# Patient Record
Sex: MALE | Race: WHITE | NOT HISPANIC OR LATINO | URBAN - METROPOLITAN AREA
[De-identification: names, ages, dates, MRNs, and addresses within clinical notes are randomized per-mention and may not be internally consistent; named-entity substitution may affect disease eponyms.]

---

## 2021-03-30 ENCOUNTER — OFFICE VISIT (OUTPATIENT)
Dept: URBAN - METROPOLITAN AREA CLINIC 74 | Facility: CLINIC | Age: 76
End: 2021-03-30
Payer: COMMERCIAL

## 2021-03-30 ENCOUNTER — DASHBOARD ENCOUNTERS (OUTPATIENT)
Age: 76
End: 2021-03-30

## 2021-03-30 VITALS
SYSTOLIC BLOOD PRESSURE: 158 MMHG | WEIGHT: 293.4 LBS | HEART RATE: 97 BPM | TEMPERATURE: 97.3 F | HEIGHT: 72 IN | DIASTOLIC BLOOD PRESSURE: 82 MMHG | BODY MASS INDEX: 39.74 KG/M2

## 2021-03-30 DIAGNOSIS — R19.5 POSITIVE FIT (FECAL IMMUNOCHEMICAL TEST): ICD-10-CM

## 2021-03-30 DIAGNOSIS — K21.9 GASTROESOPHAGEAL REFLUX DISEASE, UNSPECIFIED WHETHER ESOPHAGITIS PRESENT: ICD-10-CM

## 2021-03-30 DIAGNOSIS — K64.9 HEMORRHOIDS, UNSPECIFIED HEMORRHOID TYPE: ICD-10-CM

## 2021-03-30 PROBLEM — 235595009: Status: ACTIVE | Noted: 2021-03-30

## 2021-03-30 PROCEDURE — 99204 OFFICE O/P NEW MOD 45 MIN: CPT | Performed by: STUDENT IN AN ORGANIZED HEALTH CARE EDUCATION/TRAINING PROGRAM

## 2021-03-30 RX ORDER — IBUPROFEN SODIUM 256 MG/1
1 TABLET WITH FOOD OR MILK AS NEEDED TABLET, COATED ORAL THREE TIMES A DAY
Status: ACTIVE | COMMUNITY

## 2021-03-30 RX ORDER — OMEPRAZOLE MAGNESIUM 10 MG/1
AS DIRECTED GRANULE, DELAYED RELEASE ORAL
Status: ACTIVE | COMMUNITY

## 2021-03-30 RX ORDER — LEVOTHYROXINE SODIUM 200 UG/1
1 TABLET IN THE MORNING ON AN EMPTY STOMACH TABLET ORAL ONCE A DAY
Status: ACTIVE | COMMUNITY

## 2021-03-30 RX ORDER — HYDROCHLOROTHIAZIDE 12.5 MG/1
1 CAPSULE IN THE MORNING CAPSULE ORAL ONCE A DAY
Status: ACTIVE | COMMUNITY

## 2021-03-30 RX ORDER — SODIUM, POTASSIUM,MAG SULFATES 17.5-3.13G
354ML SOLUTION, RECONSTITUTED, ORAL ORAL
Qty: 354 MILLILITER | Refills: 0 | OUTPATIENT
Start: 2021-03-30 | End: 2021-03-31

## 2021-03-30 RX ORDER — FOLIC ACID 0.8 MG
1 TABLET TABLET ORAL ONCE A DAY
Status: ACTIVE | COMMUNITY

## 2021-03-30 RX ORDER — ATORVASTATIN CALCIUM 10 MG/1
1 TABLET TABLET, FILM COATED ORAL ONCE A DAY
Status: ACTIVE | COMMUNITY

## 2021-03-30 NOTE — HPI-TODAY'S VISIT:
75-year-old male Comes in after his stool test became positive for blood in stool. Patient last colonoscopy was about 10 years back.  At this colonoscopy there were no polyps found.  Patient was given a 10-year interval for repeat colonoscopy.  Patient has been getting yearly stool based fecal immunochemical testing which turned out to be positive this year.  Patient denies seeing any overt blood in his stools.  No constipation or diarrhea.  No abdomen pain.  No nausea or vomiting.  No reflux or dysphagia.  Patient does takes PPI at baseline for control of his reflux and he does seems to keep it under control. Good appetite and denies any weight loss. No family history of colon cancer. No anticoagulation.  Patient takes aspirin 81 mg on every day basis. Patient reports remote history of blood on toilet paper which he attributes to his hemorrhoids which bother him very intermittently and patient does not want them to be addressed at all.

## 2021-04-30 PROBLEM — 70153002: Status: ACTIVE | Noted: 2021-03-30

## 2021-04-30 PROBLEM — 59614000: Status: ACTIVE | Noted: 2021-03-30

## 2021-05-06 ENCOUNTER — OFFICE VISIT (OUTPATIENT)
Dept: URBAN - METROPOLITAN AREA SURGERY CENTER 30 | Facility: SURGERY CENTER | Age: 76
End: 2021-05-06
Payer: COMMERCIAL

## 2021-05-06 DIAGNOSIS — D12.2 ADENOMA OF ASCENDING COLON: ICD-10-CM

## 2021-05-06 DIAGNOSIS — K62.1 DYSPLASTIC POLYP OF RECTUM: ICD-10-CM

## 2021-05-06 DIAGNOSIS — R19.5 ABNORMAL FECES: ICD-10-CM

## 2021-05-06 DIAGNOSIS — K63.89 BACTERIAL OVERGROWTH SYNDROME: ICD-10-CM

## 2021-05-06 PROCEDURE — G8907 PT DOC NO EVENTS ON DISCHARG: HCPCS | Performed by: STUDENT IN AN ORGANIZED HEALTH CARE EDUCATION/TRAINING PROGRAM

## 2021-05-06 PROCEDURE — 45385 COLONOSCOPY W/LESION REMOVAL: CPT | Performed by: STUDENT IN AN ORGANIZED HEALTH CARE EDUCATION/TRAINING PROGRAM

## 2025-05-06 ENCOUNTER — OFFICE VISIT (OUTPATIENT)
Dept: URBAN - METROPOLITAN AREA CLINIC 80 | Facility: CLINIC | Age: 80
End: 2025-05-06
Payer: COMMERCIAL

## 2025-05-06 DIAGNOSIS — E86.0 DEHYDRATION: ICD-10-CM

## 2025-05-06 DIAGNOSIS — R13.19 ESOPHAGEAL DYSPHAGIA: ICD-10-CM

## 2025-05-06 PROCEDURE — 99205 OFFICE O/P NEW HI 60 MIN: CPT | Performed by: PHYSICIAN ASSISTANT

## 2025-05-06 RX ORDER — CANDESARTAN CILEXETIL 16 MG/1
AS DIRECTED TABLET ORAL
Status: ACTIVE | COMMUNITY

## 2025-05-06 RX ORDER — OMEPRAZOLE MAGNESIUM 10 MG/1
AS DIRECTED GRANULE, DELAYED RELEASE ORAL
Status: ACTIVE | COMMUNITY

## 2025-05-06 RX ORDER — FOLIC ACID 0.8 MG
1 TABLET TABLET ORAL ONCE A DAY
Status: ACTIVE | COMMUNITY

## 2025-05-06 RX ORDER — ALPRAZOLAM 1 MG/1
AS DIRECTED TABLET ORAL
Status: ACTIVE | COMMUNITY
Start: 2025-05-06

## 2025-05-06 RX ORDER — TIRZEPATIDE 15 MG/.5ML
AS DIRECTED INJECTION, SOLUTION SUBCUTANEOUS
Status: ACTIVE | COMMUNITY

## 2025-05-06 RX ORDER — HYDROCHLOROTHIAZIDE 12.5 MG/1
1 CAPSULE IN THE MORNING CAPSULE ORAL ONCE A DAY
Status: DISCONTINUED | COMMUNITY

## 2025-05-06 RX ORDER — IBUPROFEN SODIUM 256 MG/1
1 TABLET WITH FOOD OR MILK AS NEEDED TABLET, COATED ORAL THREE TIMES A DAY
Status: DISCONTINUED | COMMUNITY

## 2025-05-06 RX ORDER — LEVOTHYROXINE SODIUM 200 UG/1
1 TABLET IN THE MORNING ON AN EMPTY STOMACH TABLET ORAL ONCE A DAY
Status: ACTIVE | COMMUNITY

## 2025-05-06 RX ORDER — ASPIRIN 81 MG/1
AS DIRECTED TABLET, COATED ORAL
Status: ACTIVE | COMMUNITY
Start: 2025-05-06

## 2025-05-06 RX ORDER — ATORVASTATIN CALCIUM 10 MG/1
1 TABLET TABLET, FILM COATED ORAL ONCE A DAY
Status: ACTIVE | COMMUNITY

## 2025-05-06 NOTE — PHYSICAL EXAM CONSTITUTIONAL:
in no acute distress, well developed, weak, ambulating without difficulty, normal communication ability

## 2025-05-06 NOTE — HPI-TODAY'S VISIT:
Pt presents stating at lunch last tuesday he swallowed either chicken or brocolli and thinks he had food stuck in chest - has happened to him 1-2 times a year for past 6 years usually resolves after a few hours - this time it lasted into the night when he woke up it had resolved he saw his PCP on Thursday and was prescribed sucralfate has been taking it he increased his Prilosec to 2 pills a day last Thursday as well he acid is better but her feels regurgitaion with any fluid he puts in him pills stay down but fluid comes back up has been rinsing his mouth with cranberry juice he has not eaten anything since last Tuesday and cannot keep fluids down has been going to an out patient hydration place got fluids last Thursday and 2 Liters today has had 1 BM in a week still urinating but not much does have epigastric discomfort had xray done yesterday that did not show an obstruction per the pt he has been on Mounjaro for a year - no increase in dose

## 2025-05-06 NOTE — PHYSICAL EXAM GASTROINTESTINAL
Abdomen, soft, nontender, nondistended, no guarding or rigidity, no masses palpable, normal bowel sounds, increased nausea when tried laying down Liver and Spleen, no hepatomegaly present, no hepatosplenomegaly, liver nontenderRectal, deferred